# Patient Record
Sex: FEMALE | Race: WHITE
[De-identification: names, ages, dates, MRNs, and addresses within clinical notes are randomized per-mention and may not be internally consistent; named-entity substitution may affect disease eponyms.]

---

## 2020-08-26 ENCOUNTER — HOSPITAL ENCOUNTER (EMERGENCY)
Dept: HOSPITAL 11 - JP.ED | Age: 55
Discharge: HOME | End: 2020-08-26
Payer: SELF-PAY

## 2020-08-26 DIAGNOSIS — J45.909: ICD-10-CM

## 2020-08-26 DIAGNOSIS — I10: ICD-10-CM

## 2020-08-26 DIAGNOSIS — Z79.899: ICD-10-CM

## 2020-08-26 DIAGNOSIS — N30.01: Primary | ICD-10-CM

## 2020-08-26 DIAGNOSIS — Z88.0: ICD-10-CM

## 2020-08-26 PROCEDURE — 99284 EMERGENCY DEPT VISIT MOD MDM: CPT

## 2020-08-26 PROCEDURE — 36415 COLL VENOUS BLD VENIPUNCTURE: CPT

## 2020-08-26 PROCEDURE — 74176 CT ABD & PELVIS W/O CONTRAST: CPT

## 2020-08-26 PROCEDURE — 85027 COMPLETE CBC AUTOMATED: CPT

## 2020-08-26 PROCEDURE — 80048 BASIC METABOLIC PNL TOTAL CA: CPT

## 2020-08-26 PROCEDURE — 81001 URINALYSIS AUTO W/SCOPE: CPT

## 2020-08-26 NOTE — EDM.PDOC
ED HPI GENERAL MEDICAL PROBLEM





- General


Chief Complaint: Abdominal Pain


Stated Complaint: BACK PAIN,NAUSEA


Time Seen by Provider: 08/26/20 15:00


Source of Information: Reports: Patient


History Limitations: Reports: No Limitations





- History of Present Illness


INITIAL COMMENTS - FREE TEXT/NARRATIVE: 


This is a 56 yo with hx of prior UTI and kidney stones who presents with 

concerns of left-sided flank and suprapubic pain.  She reports symptoms started 

several days ago, unclear exactly when.  She has had worsening, fairly 

persistent lightheaded flank pain that is accompanied her suprapubic discomfort.

 She also endorses urinary frequency.  She has had some associated nausea.  She 

has no fevers or chills.  She has a history of prior renal stones, and reports 

that she has had to have ureteral stenting in the past.  She was treated 

approximately 2 weeks ago in [place] for a urinary infection with 

ciprofloxacin.





  ** Back


Pain Score (Numeric/FACES): 8





- Related Data


                                    Allergies











Allergy/AdvReac Type Severity Reaction Status Date / Time


 


Penicillins Allergy  Rash Verified 08/26/20 14:53











Home Meds: 


                                    Home Meds





Albuterol Sulfate [Albuterol Sulfate Hfa] 8.5 gm IH Q4HR PRN 08/26/20 [History]


Ciprofloxacin [Ciprofloxacin HCl] 500 mg PO BID #14 tab 08/26/20 [Rx]


Ondansetron [Zofran ODT] 4 mg PO Q6H PRN #5 tab.dis 08/26/20 [Rx]


lisinopriL [Lisinopril] 40 mg PO DAILY 08/26/20 [History]


traZODone 100 mg PO BEDTIME 08/26/20 [History]











Past Medical History


HEENT History: Reports: Allergic Rhinitis, Sinusitis


Cardiovascular History: Reports: Hypertension


Respiratory History: Reports: Asthma


Gastrointestinal History: Reports: None


Genitourinary History: Reports: Renal Calculus


OB/GYN History: Reports: Pregnancy


Musculoskeletal History: Reports: Arthritis


Neurological History: Reports: Migraines


Psychiatric History: Reports: ADHD, Depression


Endocrine/Metabolic History: Reports: None


Hematologic History: Reports: None


Immunologic History: Reports: None


Oncologic (Cancer) History: Reports: None


Dermatologic History: Reports: None





- Infectious Disease History


Infectious Disease History: Reports: Chicken Pox





- Past Surgical History


Cardiovascular Surgical History: Reports: None


Respiratory Surgical History: Reports: None





Social & Family History





- Tobacco Use


Smoking Status *Q: Never Smoker





- Caffeine Use


Caffeine Use: Reports: Coffee, Soda





- Recreational Drug Use


Recreational Drug Type: Reports: Methamphetamine


Other Recreational Drug Type: opiods





ED ROS GENERAL





- Review of Systems


Review Of Systems: See Below


Constitutional: Reports: No Symptoms


HEENT: Reports: No Symptoms


Respiratory: Reports: No Symptoms


Cardiovascular: Reports: No Symptoms


Endocrine: Reports: No Symptoms


GI/Abdominal: Reports: No Symptoms


: Reports: Flank Pain, Frequency


Musculoskeletal: Reports: No Symptoms


Skin: Reports: No Symptoms


Neurological: Reports: No Symptoms


Psychiatric: Reports: No Symptoms


Hematologic/Lymphatic: Reports: No Symptoms


Immunologic: Reports: No Symptoms





ED EXAM, GI/ABD





- Physical Exam


Exam: See Below


Exam Limited By: No Limitations


General Appearance: Alert, Mild Distress


Ears: Normal External Exam


Nose: Normal Inspection


Throat/Mouth: Normal Inspection


Head: Atraumatic, Normocephalic


Neck: Normal Inspection


Respiratory/Chest: Lungs Clear


Cardiovascular: Regular Rate, Rhythm


GI/Abdominal Exam: Soft, Tender (suprapubic and left CVA tenderness)


Back Exam: Normal Inspection, CVA Tenderness (L)


Extremities: Normal Inspection


Neurological: Alert, Oriented


Psychiatric: Normal Affect, Normal Mood


Skin Exam: Warm, Dry





Course





- Vital Signs


Last Recorded V/S: 


                                Last Vital Signs











Temp  36.3 C   08/26/20 14:47


 


Pulse  69   08/26/20 16:58


 


Resp  16   08/26/20 16:20


 


BP  126/82   08/26/20 16:58


 


Pulse Ox  94 L  08/26/20 16:20














- Orders/Labs/Meds


Labs: 


                                Laboratory Tests











  08/26/20 08/26/20 08/26/20 Range/Units





  14:43 15:12 15:13 


 


WBC    8.4  (4.5-11.0)  K/uL


 


RBC    3.97  (3.30-5.50)  M/uL


 


Hgb    12.1  (12.0-15.0)  g/dL


 


Hct    37.0  (36.0-48.0)  %


 


MCV    93  (80-98)  fL


 


MCH    31  (27-31)  pg


 


MCHC    33  (32-36)  %


 


Plt Count    236  (150-400)  K/uL


 


Sodium   140   (140-148)  mmol/L


 


Potassium   3.7   (3.6-5.2)  mmol/L


 


Chloride   105   (100-108)  mmol/L


 


Carbon Dioxide   23   (21-32)  mmol/L


 


Anion Gap   12.4   (5.0-14.0)  mmol/L


 


BUN   25 H   (7-18)  mg/dL


 


Creatinine   0.9   (0.6-1.0)  mg/dL


 


Est Cr Clr Drug Dosing   64.30   mL/min


 


Estimated GFR (MDRD)   > 60   (>60)  


 


Glucose   91   ()  mg/dL


 


Calcium   9.1   (8.5-10.1)  mg/dL


 


Urine Color  Yellow    (YELLOW)  


 


Urine Appearance  Cloudy A    (CLEAR)  


 


Urine pH  5.5    (5.0-8.0)  


 


Ur Specific Gravity  >= 1.030    (1.008-1.030)  


 


Urine Protein  100 H    (NEGATIVE)  mg/dL


 


Urine Glucose (UA)  Negative    (NEGATIVE)  mg/dL


 


Urine Ketones  Negative    (NEGATIVE)  mg/dL


 


Urine Occult Blood  Large H    (NEGATIVE)  


 


Urine Nitrite  Negative    (NEGATIVE)  


 


Urine Bilirubin  Negative    (NEGATIVE)  


 


Urine Urobilinogen  0.2    (0.2-1.0)  EU/dL


 


Ur Leukocyte Esterase  Small H    (NEGATIVE)  


 


Urine RBC   H    (0-5)  


 


Urine WBC  5-10 H    (0-5)  


 


Ur Epithelial Cells  Few    


 


Amorphous Sediment  Not seen    


 


Urine Bacteria  Moderate    


 


Urine Mucus  Not seen    











Meds: 


Medications














Discontinued Medications














Generic Name Dose Route Start Last Admin





  Trade Name Freq  PRN Reason Stop Dose Admin


 


Ciprofloxacin  500 mg  08/26/20 15:14  08/26/20 15:22





  Ciprofloxacin Hcl  PO  08/26/20 15:15  500 mg





  ONETIME ONE   Administration


 


Ondansetron HCl  4 mg  08/26/20 15:13  08/26/20 15:22





  Zofran Odt  PO  08/26/20 15:14  4 mg





  ONETIME ONE   Administration














- Re-Assessments/Exams


Free Text/Narrative Re-Assessment/Exam: 


55-year-old presents with concerns of left-sided and suprapubic flank pain.  Has

 history of prior UTIs as well as several large renal stones.


On exam she has normal vital signs.  She is quite tender around the left flank 

and suprapubically.


By history this sounds most consistent with an ascending urinary infection.  

However given her history of stones and degree of discomfort, along with 

hematuria detected on UA, I think we should do a CT of her abdomen to rule out 

another obstructing stone.


We are also obtaining basic labs, treating nausea with Zofran, and administer 

dose of ciprofloxacin.


08/26/20 15:24





Free Text/Narrative Re-Assessment/Exam: 


CT without evidence of ureteral stone.


Labs otherwise unremarkable.


Patient feels much better after Zofran is resting comfortably.


I suspect this is all urinary tract infection and possibly early pyelonephritis.


I have prescribed her a course of ciprofloxacin and Zofran.  She is tolerating 

p.o. here.


We discussed return precautions including signs of systemic infection.


We have set her up with a phone call to establish a PCP.  She is also noted to 

have a cyst in her right paraspinal muscles on imaging, this is contralateral to

 her current symptoms and she has no tenderness here on palpation.  She will 

receive prompt follow-up for this as well where they can consider MR imaging per

 radiology recommendations.


Discharged.


08/26/20 18:38








Departure





- Departure


Time of Disposition: 17:25


Disposition: Home, Self-Care 01


Clinical Impression: 


Urinary tract infection


Qualifiers:


 Urinary tract infection type: acute cystitis Hematuria presence: with hematuria

 Qualified Code(s): N30.01 - Acute cystitis with hematuria








- Discharge Information


*PRESCRIPTION DRUG MONITORING PROGRAM REVIEWED*: No


*COPY OF PRESCRIPTION DRUG MONITORING REPORT IN PATIENT [facility]: No


Prescriptions: 


Ciprofloxacin [Ciprofloxacin HCl] 500 mg PO BID #14 tab


Ondansetron [Zofran ODT] 4 mg PO Q6H PRN #5 tab.dis


 PRN Reason: Nausea


Instructions:  Urinary Tract Infection, Adult, Antibiotic Medicine, Adult, 

Urosepsis, Adult


Referrals: 


PCP,None [Primary Care Provider] - 


Forms:  ED Department Discharge


Additional Instructions: 


You are being diagnosed with a urinary tract infection, you may also have an 

early infection in your kidney.


Please take the prescribed antibiotic. You are also being prescribed zofran for 

nausea.


If you develop worsening pain, fevers, or signs that feel similar to when you 

were septic please return to the ER.


You need to follow up with a primary doctor for the possible cyst in your back, 

you will receive a phone call for this. You may need an MRI.


Thank you for allowing us to care for you today.





Sepsis Event Note (ED)





- Evaluation


Sepsis Screening Result: No Definite Risk





- Focused Exam


Vital Signs: 


                                   Vital Signs











  Temp Pulse Resp BP Pulse Ox


 


 08/26/20 16:58   69   126/82 


 


 08/26/20 16:20   69  16  121/63  94 L


 


 08/26/20 14:47  36.3 C  88  16  144/91 H  98

## 2020-08-26 NOTE — CRLCT
INDICATION:



Left flank pain



TECHNIQUE:



Axial images were obtained from the diaphragm to the pubic symphysis.



Reformats were obtained in the coronal and sagittal plane.



IV Contrast: None



Oral Contrast: None



COMPARISON:



None. 



FINDINGS:



Lower chest: Unremarkable. 



Liver: Unremarkable. Normal in size and attenuation. No masses. 



Gallbladder and bile ducts: Unremarkable. No stones or inflammation. No 

biliary dilatation. 



Spleen: Unremarkable. Normal in size without mass.



Pancreas: Unremarkable. No mass or inflammation. 



Adrenal glands: Unremarkable. No nodules. 



Kidneys: Mild scarring left kidney with bilateral nonobstructing 

nephrolithiasis including a stone within the right renal pelvis however, no 

hydronephrosis or ureteral stone seen. 



Vasculature: Unremarkable.



GI tract: Stomach is unremarkable. No dilated loops of large or small 

intestine. Colonic diverticulosis.



Pelvis: Fluid density lesion right paraspinal tissues measuring 2.4 

centimeters and 16 Hounsfield units (2, 66; 3, 82)). Bladder unremarkable. 

No adnexal mass. 



Bones: Degenerative disc disease lumbar spine. 



IMPRESSION:



1. Nephrolithiasis without evidence of ureteral stone or hydronephrosis.



2. Colonic diverticulosis without varinder diverticulitis.



3. Fluid density lesion right paraspinal tissues measuring 2.4 centimeters. 

Lesion is considered incompletely characterized although differential 

diagnosis would include a cyst or neurogenic lesion. Outpatient MRI would 

have improved characterization.



Please note that all CT scans at this facility use dose modulation, 

iterative reconstruction, and/or weight-based dosing when appropriate to 

reduce radiation dose to as low as reasonably achievable.



Dictated by Anjel Martinez MD @ Aug 26 2020  4:17PM



Signed by Dr. Anjel Martinez @ Aug 26 2020  4:31PM

## 2020-09-05 ENCOUNTER — HOSPITAL ENCOUNTER (EMERGENCY)
Dept: HOSPITAL 11 - JP.ED | Age: 55
Discharge: HOME | End: 2020-09-05
Payer: MEDICAID

## 2020-09-05 DIAGNOSIS — Z88.0: ICD-10-CM

## 2020-09-05 DIAGNOSIS — F32.9: ICD-10-CM

## 2020-09-05 DIAGNOSIS — J45.909: ICD-10-CM

## 2020-09-05 DIAGNOSIS — Z79.899: ICD-10-CM

## 2020-09-05 DIAGNOSIS — F41.9: Primary | ICD-10-CM

## 2020-09-05 DIAGNOSIS — I10: ICD-10-CM

## 2020-09-05 DIAGNOSIS — T78.40XA: ICD-10-CM

## 2020-09-05 NOTE — EDM.PDOC
ED HPI GENERAL MEDICAL PROBLEM





- General


Chief Complaint: Respiratory Problem


Stated Complaint: SOB


Time Seen by Provider: 09/05/20 21:10


Source of Information: Reports: Patient, RN, RN Notes Reviewed


History Limitations: Reports: No Limitations





- History of Present Illness


INITIAL COMMENTS - FREE TEXT/NARRATIVE: 





c/o of SOB more lately.  Today is more difficult for her.  She states she has 

moved here from [facility]. The person she is staying with has dogs and 

smokes.  This seems to make her SOB worse.  She does have an inhaler but she has

not been using it.  She has tried Mucinex in the past which has helped but she 

is not using this now.  She also has allergy medicine that she usually takes but

has not been taking it recently.   


Onset: Today


Onset Date: 09/05/20


Onset Time: 08:00


Duration: Minutes:


Location: Reports: Face (Patient congestion), Chest (Shortness of breath)


Quality: Reports: Pressure


Severity: Mild


Improves with: Reports: Medication


Worsens with: Reports: Other (Has not been taking her medications)


Context: Reports: Activity


Associated Symptoms: Reports: Cough (Rarely coughs), Shortness of Breath.  

Denies: Chest Pain, Fever/Chills





- Related Data


                                    Allergies











Allergy/AdvReac Type Severity Reaction Status Date / Time


 


Penicillins Allergy  Rash Verified 09/05/20 20:42











Home Meds: 


                                    Home Meds





Albuterol Sulfate [Albuterol Sulfate Hfa] 8.5 gm IH Q4HR PRN 08/26/20 [History]


Ciprofloxacin [Ciprofloxacin HCl] 500 mg PO BID #14 tab 08/26/20 [Rx]


Ondansetron [Zofran ODT] 4 mg PO Q6H PRN #5 tab.dis 08/26/20 [Rx]


lisinopriL [Lisinopril] 40 mg PO DAILY 08/26/20 [History]


traZODone 100 mg PO BEDTIME 08/26/20 [History]











Past Medical History


HEENT History: Reports: Allergic Rhinitis, Sinusitis


Cardiovascular History: Reports: Hypertension


Respiratory History: Reports: Asthma


Gastrointestinal History: Reports: None


Genitourinary History: Reports: Renal Calculus


OB/GYN History: Reports: Pregnancy


Musculoskeletal History: Reports: Arthritis


Neurological History: Reports: Migraines


Psychiatric History: Reports: ADHD, Depression


Endocrine/Metabolic History: Reports: None


Hematologic History: Reports: None


Immunologic History: Reports: None


Oncologic (Cancer) History: Reports: None


Dermatologic History: Reports: None





- Infectious Disease History


Infectious Disease History: Reports: Chicken Pox





- Past Surgical History


Cardiovascular Surgical History: Reports: None


Respiratory Surgical History: Reports: None





Social & Family History





- Tobacco Use


Smoking Status *Q: Never Smoker


Second Hand Smoke Exposure: Yes





- Caffeine Use


Caffeine Use: Reports: Coffee, Soda





ED ROS GENERAL





- Review of Systems


Review Of Systems: See Below


Constitutional: Denies: Fever, Chills, Fatigue, Diaphoresis


HEENT: Reports: Ear Pain, Sinus Problem.  Denies: Dental Pain, Ear Discharge, 

Hearing Loss, Nose Pain, Throat Pain


Respiratory: Reports: Shortness of Breath, Cough.  Denies: Wheezing, Pleuritic 

Chest Pain, Sputum, Hemoptysis


Cardiovascular: Denies: Chest Pain, Palpitations, Syncope


Endocrine: Reports: No Symptoms


GI/Abdominal: Denies: Abdominal Pain, Nausea, Vomiting


: Reports: No Symptoms


Musculoskeletal: Denies: Neck Pain, Shoulder Pain, Joint Pain


Skin: Reports: No Symptoms


Neurological: Reports: No Symptoms.  Denies: Headache, Numbness


Psychiatric: Reports: Agitation, Anxiety


Hematologic/Lymphatic: Reports: No Symptoms


Immunologic: Reports: Seasonal Allergy





ED EXAM, GENERAL





- Physical Exam


Exam: See Below


Free Text/Narrative:: 





It is difficult to examine.  Patient will sit still then becomes jumpy and is 

moving around.  She is having a hard time following direction and difficulty 

focusing.  When I entered the room the light was off and the patient was making 

the bed.  Patient does answer questions appropriately however she does macey her

 thoughts.


Exam Limited By: Other (As noted in narrative)


General Appearance: Alert, Anxious


Eye Exam: Bilateral Eye: PERRL


Ears: Normal External Exam, Normal Canal, Hearing Grossly Normal, Normal TMs


Ear Exam: Bilateral Ear: TM normal


Nose: Normal Inspection, Normal Mucosa, No Blood.  No: Nasal Tenderness, Nasal 

Deformity, Nasal Swelling, Nasal Drainage


Throat/Mouth: Normal Inspection, Normal Lips, Normal Teeth, Normal Gums, Normal 

Oropharynx, Normal Voice, No Airway Compromise


Head: Atraumatic, Normocephalic


Neck: Normal Inspection, Supple, Non-Tender, Full Range of Motion


Respiratory/Chest: No Respiratory Distress, Lungs Clear, Normal Breath Sounds, 

No Accessory Muscle Use, Chest Non-Tender.  No: Respiratory Distress


Cardiovascular: Normal Peripheral Pulses, Regular Rate, Rhythm, No Edema, No 

Gallop, No JVD, No Murmur, No Rub


GI/Abdominal: Normal Bowel Sounds, Non-Tender, No Organomegaly, No Distention, 

No Abnormal Bruit, No Mass


Back Exam: Normal Inspection, Full Range of Motion


Extremities: Normal Inspection, Normal Range of Motion, Non-Tender, No Pedal 

Edema, Normal Capillary Refill


Neurological: Alert, Oriented, CN II-XII Intact, Normal Cognition, Normal Gait, 

Normal Reflexes, No Motor/Sensory Deficits


Psychiatric: Anxious


Skin Exam: Warm, Dry, Intact, Normal Color, No Rash


Lymphatic: No Adenopathy





Course





- Vital Signs


Last Recorded V/S: 


                                Last Vital Signs











Temp  36.4 C   09/05/20 20:49


 


Pulse  99   09/05/20 20:49


 


Resp  15   09/05/20 20:49


 


BP  122/72   09/05/20 20:49


 


Pulse Ox  99   09/05/20 20:49














- Re-Assessments/Exams


Free Text/Narrative Re-Assessment/Exam: 





09/05/20 21:46


Exam is without significant findings.  She is not currently taking medications 

that normally have helped her in the past.  She is encouraged to try her inhaler

 as well as her allergy medication.  She may include a decongestant as she has 

tried in the past that is been helpful.  Sats are  97-99 on room air.  Patient 

is without obvious distress.  Patient states she is try to use a nebulizer as 

well but was not sure how to put it together.  Patient agrees to try remedies 

that worked in the past that she is not using currently.  The patient was 

questioned as to whether or not she felt safe at home.  She does states she does

 feel safe however she is uncomfortable as it is not her own home.  Patient does

 agree he has some anxiety





Departure





- Departure


Time of Disposition: 22:20


Disposition: Home, Self-Care 01


Clinical Impression: 


 Anxiety, Allergies








- Discharge Information


*PRESCRIPTION DRUG MONITORING PROGRAM REVIEWED*: Not Applicable


*COPY OF PRESCRIPTION DRUG MONITORING REPORT IN PATIENT MYNOR: Not Applicable


Instructions:  Asthma, Adult, Shortness of Breath, Adult, Easy-to-Read, Living 

With Anxiety


Referrals: 


PCP,None [Primary Care Provider] - 


Forms:  ED Department Discharge


Additional Instructions: 


Try current remedies that have worked in the past but you are not using.  If 

symptoms continue do not hesitate to return to clinic or ER for reevaluation.  

Patient is in agreement with this plan and is comfortable going home





Sepsis Event Note (ED)





- Evaluation


Sepsis Screening Result: No Definite Risk





- Focused Exam


Vital Signs: 


                                   Vital Signs











  Temp Pulse Resp BP Pulse Ox


 


 09/05/20 20:49  36.4 C  99  15  122/72  99

## 2021-04-08 ENCOUNTER — HOSPITAL ENCOUNTER (EMERGENCY)
Dept: HOSPITAL 11 - JP.ED | Age: 56
Discharge: HOME | End: 2021-04-08
Payer: MEDICAID

## 2021-04-08 DIAGNOSIS — Z79.899: ICD-10-CM

## 2021-04-08 DIAGNOSIS — Z88.0: ICD-10-CM

## 2021-04-08 DIAGNOSIS — J45.909: ICD-10-CM

## 2021-04-08 DIAGNOSIS — S39.012A: Primary | ICD-10-CM
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
## 2021-04-08 DIAGNOSIS — I10: ICD-10-CM

## 2021-04-08 PROCEDURE — 99283 EMERGENCY DEPT VISIT LOW MDM: CPT

## 2021-04-08 NOTE — EDM.PDOC
ED HPI GENERAL MEDICAL PROBLEM





- General


Chief Complaint: Back Pain or Injury


Stated Complaint: LEFT SIDE BACK PAIN


Time Seen by Provider: 04/08/21 15:30


Source of Information: Reports: Patient


History Limitations: Reports: No Limitations





- History of Present Illness


INITIAL COMMENTS - FREE TEXT/NARRATIVE: 





55-year-old female with no chronic recurring back issues, has been working hard 

over the past several weeks ending a lot and is developed some low back 

discomfort.  She has been having problems straightening up after bending and the

pain seems to be just above the sacrum and somewhat worse on the left.  No 

radiculopathy, no urinary symptoms, she actually feels a little better today 

than she did yesterday but she thought she should get it checked and get some 

guidance as to whether she should keep working.


Onset: Gradual


Duration: Day(s): (4 days of symptoms)


Location: Reports: Back (Lower back, somewhat worse on the left)


Quality: Reports: Sharp


Worsens with: Reports: Other (Bending or straightening after bending is 

painful), Movement


Associated Symptoms: Reports: No Other Symptoms


  ** Lower Back


Pain Score (Numeric/FACES): 5





- Related Data


                                    Allergies











Allergy/AdvReac Type Severity Reaction Status Date / Time


 


Penicillins Allergy Intermediate Rash Verified 04/08/21 15:11











Home Meds: 


                                    Home Meds





Albuterol Sulfate [Albuterol Sulfate Hfa] 8.5 gm IH Q4HR PRN 08/26/20 [History]


lisinopriL [Lisinopril] 20 mg PO DAILY 08/26/20 [History]


Lisdexamfetamine Dimesylate [Vyvanse] 20 mg PO DAILY 04/08/21 [History]


QUEtiapine [SEROquel] 25 mg PO BEDTIME 04/08/21 [History]











Past Medical History


HEENT History: Reports: Allergic Rhinitis, Sinusitis


Cardiovascular History: Reports: Hypertension


Respiratory History: Reports: Asthma


Gastrointestinal History: Reports: None


Genitourinary History: Reports: Renal Calculus


OB/GYN History: Reports: Pregnancy


Musculoskeletal History: Reports: Arthritis


Neurological History: Reports: Migraines


Psychiatric History: Reports: ADHD, Anxiety, Depression


Endocrine/Metabolic History: Reports: None


Hematologic History: Reports: None


Immunologic History: Reports: None


Oncologic (Cancer) History: Reports: None


Dermatologic History: Reports: None





- Infectious Disease History


Infectious Disease History: Reports: Chicken Pox





- Past Surgical History


Cardiovascular Surgical History: Reports: None


Respiratory Surgical History: Reports: None





Social & Family History





- Tobacco Use


Tobacco Use Status *Q: Never Tobacco User





- Caffeine Use


Caffeine Use: Reports: Coffee, Energy Drinks, Soda, Tea





- Recreational Drug Use


Recreational Drug Use: No





ED ROS GENERAL





- Review of Systems


Review Of Systems: See Below


Constitutional: Denies: Fever, Chills


HEENT: Reports: No Symptoms


Respiratory: Denies: Shortness of Breath


Cardiovascular: Denies: Chest Pain


GI/Abdominal: Denies: Abdominal Pain, Nausea, Vomiting


Musculoskeletal: Reports: Back Pain


Skin: Reports: No Symptoms.  Denies: Rash


Neurological: Denies: Paresthesia





ED EXAM,LOWER BACK PAIN/INJURY





- Physical Exam


Exam: See Below


Exam Limited By: No Limitations


General Appearance: Alert, No Apparent Distress


Head: Atraumatic


Respiratory/Chest: No Respiratory Distress


Back Exam: Paraspinal Tenderness (Significant para spinal tenderness over the 

lumbar area, especially on the left.  Increased pain with rotation to the left 

against resistance, also increased pain with forward bending past 30 degrees)


Extremities: No: Pedal Edema


Neurological: Alert, Normal Mood/Affect, Oriented x 3


Psychiatric: Normal Affect, Normal Mood


Skin Exam: Warm, Dry





Course





- Vital Signs


Last Recorded V/S: 


                                Last Vital Signs











Temp  98.4 F   04/08/21 15:25


 


Pulse  76   04/08/21 15:25


 


Resp  17   04/08/21 15:25


 


BP  149/90 H  04/08/21 15:25


 


Pulse Ox  99   04/08/21 15:25














- Orders/Labs/Meds


Meds: 


Medications














Discontinued Medications














Generic Name Dose Route Start Last Admin





  Trade Name Jc  PRN Reason Stop Dose Admin


 


Cyclobenzaprine HCl  10 mg  04/08/21 15:44  04/08/21 16:30





  Cyclobenzaprine 10 Mg Tab  PO  04/08/21 15:45  10 mg





  ONETIME ONE   Administration














- Re-Assessments/Exams


Free Text/Narrative Re-Assessment/Exam: 





04/08/21 15:48


Patient has a routine lower back strain which should be rested for the next 24 

hours, continue with anti-inflammatories and she was provided with Flexeril.  

Increase activity as tolerated and recheck in 3 to 4 days if not improving 

satisfactorily.  Return sooner if she develops worsening symptoms such as 

incontinence or radiculopathy which was discussed with the patient.





Departure





- Departure


Time of Disposition: 16:42


Disposition: Home, Self-Care 01


Clinical Impression: 


Low back strain


Qualifiers:


 Encounter type: initial encounter Qualified Code(s): S39.012A - Strain of 

muscle, fascia and tendon of lower back, initial encounter








- Discharge Information


Instructions:  Lumbosacral Strain


Referrals: 


Leatha Garcia DO [Primary Care Provider] - 


Forms:  ED Department Discharge


Care Plan Goals: 


Rest today, regular dose of Aleve 2 pills twice daily for the next 5 days.  Add 

Flexeril as needed up to 3 times daily.  Increase activity as tolerated and 

recheck next week if not improving satisfactorily.





Sepsis Event Note (ED)





- Evaluation


Sepsis Screening Result: No Definite Risk